# Patient Record
Sex: MALE | Race: WHITE | NOT HISPANIC OR LATINO | Employment: UNEMPLOYED | URBAN - METROPOLITAN AREA
[De-identification: names, ages, dates, MRNs, and addresses within clinical notes are randomized per-mention and may not be internally consistent; named-entity substitution may affect disease eponyms.]

---

## 2020-10-27 ENCOUNTER — HOSPITAL ENCOUNTER (EMERGENCY)
Facility: MEDICAL CENTER | Age: 20
End: 2020-10-27

## 2020-10-27 ENCOUNTER — OFFICE VISIT (OUTPATIENT)
Dept: URGENT CARE | Facility: CLINIC | Age: 20
End: 2020-10-27

## 2020-10-27 VITALS
DIASTOLIC BLOOD PRESSURE: 68 MMHG | BODY MASS INDEX: 23.1 KG/M2 | OXYGEN SATURATION: 98 % | RESPIRATION RATE: 16 BRPM | WEIGHT: 180 LBS | HEIGHT: 74 IN | HEART RATE: 60 BPM | TEMPERATURE: 97.2 F | SYSTOLIC BLOOD PRESSURE: 118 MMHG

## 2020-10-27 VITALS
WEIGHT: 180 LBS | RESPIRATION RATE: 18 BRPM | SYSTOLIC BLOOD PRESSURE: 115 MMHG | HEIGHT: 74 IN | BODY MASS INDEX: 23.1 KG/M2 | TEMPERATURE: 98.8 F | OXYGEN SATURATION: 99 % | HEART RATE: 92 BPM | DIASTOLIC BLOOD PRESSURE: 72 MMHG

## 2020-10-27 DIAGNOSIS — S81.812A LEG LACERATION, LEFT, INITIAL ENCOUNTER: ICD-10-CM

## 2020-10-27 PROCEDURE — 302449 STATCHG TRIAGE ONLY (STATISTIC)

## 2020-10-27 PROCEDURE — 90471 IMMUNIZATION ADMIN: CPT | Performed by: PHYSICIAN ASSISTANT

## 2020-10-27 PROCEDURE — 12002 RPR S/N/AX/GEN/TRNK2.6-7.5CM: CPT | Performed by: PHYSICIAN ASSISTANT

## 2020-10-27 PROCEDURE — 90715 TDAP VACCINE 7 YRS/> IM: CPT | Performed by: PHYSICIAN ASSISTANT

## 2020-10-27 ASSESSMENT — ENCOUNTER SYMPTOMS
DIAPHORESIS: 1
NAUSEA: 0
SENSORY CHANGE: 0
BLURRED VISION: 0
SORE THROAT: 0
FEVER: 0
SHORTNESS OF BREATH: 0
CHILLS: 1
WHEEZING: 0
PHOTOPHOBIA: 0
SINUS PAIN: 0
PALPITATIONS: 0
DOUBLE VISION: 0
DIZZINESS: 0
MYALGIAS: 0
LOSS OF CONSCIOUSNESS: 0
COUGH: 0
ABDOMINAL PAIN: 0
TINGLING: 0
VOMITING: 0
HEADACHES: 0

## 2020-10-28 NOTE — ED TRIAGE NOTES
"Pt walked into triage with a steady gait c/o lac to R thigh.  Pt has approx 3cm lac to the R thigh secondary to \"slipping with a small knife cutting something at home\"    Pt & staff masked and in appropriate PPE during encounter.  Pt denies fever/travel or being in contact with anyone testing positive for Covid.  Explained pt the triage process, made pt aware to tell the RN/staff of any changes/concerns, pt verbalized understanding of process and instructions given.  Pt to ER lobby.    "

## 2020-10-28 NOTE — PROGRESS NOTES
Subjective:   Jerardo Crespo is a 20 y.o. male who presents for No chief complaint on file.      Laceration   The incident occurred 1 to 3 hours ago. The laceration is located on the left leg. The laceration is 3 cm in size. Injury mechanism: . The pain is mild. The pain has been constant since onset. He reports no foreign bodies present. His tetanus status is out of date.       Review of Systems   Constitutional: Positive for chills, diaphoresis and malaise/fatigue. Negative for fever.   HENT: Negative for congestion, sinus pain and sore throat.    Eyes: Negative for blurred vision, double vision and photophobia.   Respiratory: Negative for cough, shortness of breath and wheezing.    Cardiovascular: Negative for chest pain and palpitations.   Gastrointestinal: Negative for abdominal pain, nausea and vomiting.   Musculoskeletal: Negative for myalgias.   Skin:        Laceration to left leg.   Neurological: Negative for dizziness, tingling, sensory change, loss of consciousness and headaches.       Medications:    • atomoxetine    Allergies: Patient has no known allergies.    Problem List: Jerardo Crespo does not have a problem list on file.    Surgical History:  No past surgical history on file.    Past Social Hx: Jerardo Crespo  reports that he has never smoked. He does not have any smokeless tobacco history on file. He reports that he does not drink alcohol or use drugs.     Past Family Hx:  Jerardo Crespo family history is not on file.     Problem list, medications, and allergies reviewed by myself today in Epic.     Objective:     There were no vitals taken for this visit.    Physical Exam  Constitutional:       General: He is not in acute distress.     Appearance: Normal appearance. He is diaphoretic. He is not ill-appearing or toxic-appearing.      Comments: Upon presentation to clinic patient had a near syncopal event after a likely vasovagal reaction to the blood.  Patient was  extremely diaphoretic and pale.  Vital signs were monitored and stable.  Within a few minutes the patient was back at baseline and felt better.    HENT:      Head: Normocephalic and atraumatic.      Right Ear: Tympanic membrane, ear canal and external ear normal.      Left Ear: Tympanic membrane, ear canal and external ear normal.      Nose: Nose normal. No congestion or rhinorrhea.      Mouth/Throat:      Mouth: Mucous membranes are moist.      Pharynx: No oropharyngeal exudate or posterior oropharyngeal erythema.   Eyes:      Conjunctiva/sclera: Conjunctivae normal.   Neck:      Musculoskeletal: Normal range of motion. No muscular tenderness.   Cardiovascular:      Rate and Rhythm: Normal rate and regular rhythm.      Pulses: Normal pulses.      Heart sounds: Normal heart sounds.   Pulmonary:      Effort: Pulmonary effort is normal.      Breath sounds: Normal breath sounds. No wheezing.   Abdominal:      Palpations: Abdomen is soft.      Tenderness: There is no abdominal tenderness.   Musculoskeletal:      Comments: Left lower extremity:  Full range of motion to the left lower extremity, pulses 2+ to the bilateral lower extremities.  Sensation full and intact.   Lymphadenopathy:      Cervical: No cervical adenopathy.   Skin:     General: Skin is warm.      Capillary Refill: Capillary refill takes less than 2 seconds.             Comments: 3 cm laceration to the left medial thigh proximal to the left knee.  Wound edges are clean.  No signs of foreign body.  Significant bleeding in clinic able to be controlled with compression.  No underlying neurovascular damage.  No underlying ligament or tendon damage.   Neurological:      General: No focal deficit present.      Mental Status: He is alert and oriented to person, place, and time. Mental status is at baseline.   Psychiatric:         Mood and Affect: Mood normal.         Thought Content: Thought content normal.           Assessment/Plan:     Diagnosis and associated  orders:   1. Leg laceration, left, initial encounter    - Tdap =>8yo IM  - Laceration Repair     Comments/MDM:     Procedure: Laceration Repair  -Risks including bleeding, nerve damage, infection, and poor cosmetic outcome discussed. Benefits and alternatives discussed.   -Clean technique with sterile instruments and suture used  -Local anesthesia with 2% lidocaine with epinephrine.  -Closed with #5  4-0 Nylon interrupted sutures with good wound approximation  -Polysporin and dressing placed  -Patient tolerated well    Tetanus updated in clinic today.  Wound care instructions discussed with the patient.  Keep wound clean and dry.  Apply topical antibiotic ointment daily.  Follow-up in clinic in 10 to 14 days for suture removal.             Differential diagnosis, natural history, supportive care, and indications for immediate follow-up discussed.    Advised the patient to follow-up with the primary care physician for recheck, reevaluation, and consideration of further management.    Please note that this dictation was created using voice recognition software. I have made reasonable attempt to correct obvious errors, but I expect that there are errors of grammar and possibly content that I did not discover before finalizing the note.    This note was electronically signed by SHINE Hutchins PA-C

## 2020-10-28 NOTE — PROCEDURES
Laceration Repair    Date/Time: 10/27/2020 10:24 PM  Performed by: Arturo Hutchins P.A.-C.  Authorized by: Arturo Hutchins P.A.-C.   Body area: lower extremity  Location details: left upper leg  Laceration length: 3 cm  Foreign bodies: no foreign bodies  Tendon involvement: none  Nerve involvement: none  Vascular damage: no  Anesthesia: local infiltration    Anesthesia:  Local Anesthetic: lidocaine 2% with epinephrine  Anesthetic total: 3 mL    Sedation:  Patient sedated: no    Preparation: Patient was prepped and draped in the usual sterile fashion.  Irrigation solution: saline  Irrigation method: syringe  Amount of cleaning: extensive  Debridement: none  Degree of undermining: none  Skin closure: 4-0 nylon  Number of sutures: 5  Technique: simple  Approximation: close  Approximation difficulty: simple  Dressing: 4x4 sterile gauze and antibiotic ointment  Patient tolerance: patient tolerated the procedure well with no immediate complications

## 2020-11-06 ENCOUNTER — OFFICE VISIT (OUTPATIENT)
Dept: URGENT CARE | Facility: CLINIC | Age: 20
End: 2020-11-06

## 2020-11-06 VITALS
BODY MASS INDEX: 23.1 KG/M2 | HEART RATE: 76 BPM | OXYGEN SATURATION: 98 % | DIASTOLIC BLOOD PRESSURE: 64 MMHG | RESPIRATION RATE: 16 BRPM | SYSTOLIC BLOOD PRESSURE: 122 MMHG | HEIGHT: 74 IN | TEMPERATURE: 99.1 F | WEIGHT: 180 LBS

## 2020-11-06 DIAGNOSIS — M79.89 PAIN AND SWELLING OF LEFT LOWER LEG: ICD-10-CM

## 2020-11-06 DIAGNOSIS — M79.662 PAIN AND SWELLING OF LEFT LOWER LEG: ICD-10-CM

## 2020-11-06 DIAGNOSIS — Z48.02 VISIT FOR SUTURE REMOVAL: ICD-10-CM

## 2020-11-06 PROCEDURE — 99214 OFFICE O/P EST MOD 30 MIN: CPT | Performed by: NURSE PRACTITIONER

## 2020-11-07 NOTE — PROGRESS NOTES
Chief Complaint   Patient presents with   • Suture / Staple Removal     x10 days        HISTORY OF PRESENT ILLNESS: Patient is a 20 y.o. male who presents to urgent care today with request for suture removal.  States that he accidentally stabbed his left thigh with a knife 10/27/2020.  He was seen in urgent care and the laceration was closed with 5 sutures.  He has been washing the area and keeping clean.  He notes worsening pain and swelling approximately 4 to 5 days after incident.  Today he denies any symptoms to suture site but is concerned about the distal swelling, ecchymosis, and tenderness.  He denies any chest pain, shortness of breath, fever, chills, malaise.    There are no active problems to display for this patient.      Allergies:Patient has no known allergies.    Current Outpatient Medications Ordered in Epic   Medication Sig Dispense Refill   • atomoxetine (STRATTERA) 40 MG capsule Take 40 mg by mouth every day.       No current [facility]-ordered facility-administered medications on file.        Past Medical History:   Diagnosis Date   • ADHD (attention deficit hyperactivity disorder)        Social History     Tobacco Use   • Smoking status: Current Every Day Smoker   • Smokeless tobacco: Never Used   Substance Use Topics   • Alcohol use: No   • Drug use: Yes     Types: Marijuana       No family status information on file.   History reviewed. No pertinent family history.    ROS:  Review of Systems   Constitutional: Negative for fever, chills, weight loss, malaise, and fatigue.   HENT: Negative for ear pain, nosebleeds, congestion, sore throat and neck pain.    Eyes: Negative for vision changes.   Neuro: Negative for headache, sensory changes, weakness, seizure, LOC.   Cardiovascular: Negative for chest pain, palpitations, orthopnea and leg swelling.   Respiratory: Negative for cough, sputum production, shortness of breath and wheezing.   Gastrointestinal: Negative for abdominal pain, nausea, vomiting or  "diarrhea.   Genitourinary: Negative for dysuria, urgency and frequency.  Musculoskeletal: Positive for left leg injury.  Positive for swelling, ecchymosis, tenderness to extremity.  Negative for falls, neck pain, back pain, joint pain, myalgias.   Skin: Positive for laceration.  Negative for rash, diaphoresis.     Exam:  /64 (Patient Position: Sitting)   Pulse 76   Temp 37.3 °C (99.1 °F) (Temporal)   Resp 16   Ht 1.88 m (6' 2\")   Wt 81.6 kg (180 lb)   SpO2 98%   General: well-nourished, well-developed male in NAD  Head: normocephalic, atraumatic  Eyes: PERRLA, no conjunctival injection, acuity grossly intact, lids normal.  Ears: normal shape and symmetry, no tenderness, no discharge. External canals are without any significant edema or erythema. Tympanic membranes are without any inflammation, no effusion. Gross auditory acuity is intact.  Nose: symmetrical without tenderness, no discharge.  Mouth/Throat: reasonable hygiene, no erythema, exudates or tonsillar enlargement.  Neck: no masses, range of motion within normal limits, no tracheal deviation. No obvious thyroid enlargement.   Lymph: no cervical adenopathy. No supraclavicular adenopathy.   Neuro: alert and oriented. Cranial nerves 1-12 grossly intact. No sensory deficit.   Cardiovascular: regular rate and rhythm. No edema.  Pulmonary: no distress. Chest is symmetrical with respiration, no wheezes, crackles, or rhonchi.   Musculoskeletal: no clubbing, appropriate muscle tone, gait is stable.  Left leg: There is a well-healed laceration site to distal medial thigh, approximately 3 cm, 5 sutures intact, no surrounding erythema, no drainage.  There is a firm nodule palpated below suture site, nontender.  Distal of the suture site, at medial knee, is tenderness, swelling, ecchymosis.  No tenderness to calf.  Skin: warm, dry, intact, no clubbing, no cyanosis, no rashes.   Psych: appropriate mood, affect, judgement.         Assessment/Plan:  1. Visit for " suture removal     2. Pain and swelling of left lower leg         Previous clinic visit encounter reviewed and considered in medical decision making today. Patient presents with penetration wound to left thigh.  Sutures removed in clinic, edges remained approximated.  Discussed concern for new onset of swelling, ecchymosis, and tenderness to distal of suture site with concern for potential thrombosis versus inflammation.  I have discussed this with the patient, he is in agreement.  Unfortunately I am unable to obtain an ultrasound for the remainder of the evening in the urgent care setting, therefore the patient is encouraged to go to a higher level of care tonight, in the emergency department, for further evaluation and care.  He is in agreement, he has yet to decide which ED he will be going to.  The patient is stable to leave POV at this time and will go directly to ED without delay.       Please note that this dictation was created using voice recognition software. I have made every reasonable attempt to correct obvious errors, but I expect that there are errors of grammar and possibly content that I did not discover before finalizing the note.      NELLY Comer.